# Patient Record
Sex: MALE | Race: WHITE | NOT HISPANIC OR LATINO | Employment: FULL TIME | URBAN - METROPOLITAN AREA
[De-identification: names, ages, dates, MRNs, and addresses within clinical notes are randomized per-mention and may not be internally consistent; named-entity substitution may affect disease eponyms.]

---

## 2017-06-26 ENCOUNTER — OFFICE VISIT (OUTPATIENT)
Dept: FAMILY MEDICINE | Facility: CLINIC | Age: 42
End: 2017-06-26
Payer: COMMERCIAL

## 2017-06-26 VITALS
WEIGHT: 181 LBS | TEMPERATURE: 97.9 F | DIASTOLIC BLOOD PRESSURE: 87 MMHG | OXYGEN SATURATION: 98 % | SYSTOLIC BLOOD PRESSURE: 139 MMHG | HEART RATE: 68 BPM | HEIGHT: 72 IN | BODY MASS INDEX: 24.52 KG/M2

## 2017-06-26 DIAGNOSIS — M54.41 LUMBAGO WITH SCIATICA, RIGHT SIDE: Primary | ICD-10-CM

## 2017-06-26 PROCEDURE — 99214 OFFICE O/P EST MOD 30 MIN: CPT | Performed by: PHYSICIAN ASSISTANT

## 2017-06-26 RX ORDER — TRAMADOL HYDROCHLORIDE 50 MG/1
TABLET ORAL
Qty: 30 TABLET | Refills: 0 | Status: SHIPPED | OUTPATIENT
Start: 2017-06-26 | End: 2017-07-03

## 2017-06-26 NOTE — PATIENT INSTRUCTIONS
Take 800 mg (4 tabs) ibuprofen with food every 8 hours for at least 3 days then as needed  I will follow up with MRI  Schedule with spine specialist  To emergency room with severe worsening symptoms    -Narcotic medications can be addicting and therefore are used for short term pain control only.  They are not intended for long-term use.    -Take them only for severe pain as needed.    -Never mix with alcohol.    -Do not drive after taking this medication.    -No refills without an office visit. No replacements will be given.    -Keep these medications kept in a safe location.  You are responsible for them.  -Do not give them to anyone else.  They are prescribed to you only.

## 2017-06-26 NOTE — MR AVS SNAPSHOT
After Visit Summary   6/26/2017    Gil Limon    MRN: [id number]           Patient Information     Date Of Birth          1975        Visit Information        Provider Department      6/26/2017 3:20 PM Kim Hearn PA-C [facility]        Today's Diagnoses     Lumbago with sciatica, right side    -  1      Care Instructions    Take 800 mg (4 tabs) ibuprofen with food every 8 hours for at least 3 days then as needed  I will follow up with MRI  Schedule with spine specialist  To emergency room with severe worsening symptoms    -Narcotic medications can be addicting and therefore are used for short term pain control only.  They are not intended for long-term use.    -Take them only for severe pain as needed.    -Never mix with alcohol.    -Do not drive after taking this medication.    -No refills without an office visit. No replacements will be given.    -Keep these medications kept in a safe location.  You are responsible for them.  -Do not give them to anyone else.  They are prescribed to you only.             Follow-ups after your visit        Future tests that were ordered for you today     Open Future Orders        Priority Expected Expires Ordered    MR Lumbar Spine w/o Contrast Routine  6/26/2018 6/26/2017            Who to contact     If you have questions or need follow up information about today's clinic visit or your schedule please contact [facility] directly at [phone].  Normal or non-critical lab and imaging results will be communicated to you by MyChart, letter or phone within 4 business days after the clinic has received the results. If you do not hear from us within 7 days, please contact the clinic through MyChart or phone. If you have a critical or abnormal lab result, we will notify you by phone as soon as possible.  Submit refill requests through [employer] or call your pharmacy and they will forward the refill request to us. Please  allow 3 business days for your refill to be completed.          Additional Information About Your Visit        Care EveryWhere ID     This is your Care EveryWhere ID. This could be used by other organizations to access your [place] medical records  [id number]        Your Vitals Were     Pulse Temperature Height Pulse Oximetry BMI (Body Mass Index)       68 97.9  F (36.6  C) (Oral) 6' (1.829 m) 98% 24.55 kg/m2        Blood Pressure from Last 3 Encounters:   06/26/17 139/87   03/27/15 139/75   03/14/14 121/66    Weight from Last 3 Encounters:   06/26/17 181 lb (82.1 kg)   03/27/15 185 lb (83.9 kg)   03/14/14 182 lb (82.6 kg)                 Today's Medication Changes          These changes are accurate as of: 6/26/17  3:58 PM.  If you have any questions, ask your nurse or doctor.               Start taking these medicines.        Dose/Directions    traMADol 50 MG tablet   Commonly known as:  ULTRAM   Used for:  Lumbago with sciatica, right side   Started by:  Kim Hearn PA-C        Take 1-2 tabs every 8 hours as needed for pain.   Quantity:  30 tablet   Refills:  0            Where to get your medicines      Some of these will need a paper prescription and others can be bought over the counter.  Ask your nurse if you have questions.     Bring a paper prescription for each of these medications     traMADol 50 MG tablet                Primary Care Provider Office Phone # Fax #    [place] [phone]       [address] [address]        Equal Access to Services     YUE GUARDADO AH: Hadii olivier potts Sojeannine, waaxda luqadaha, qaybta kaalmajess spears. So [facility] [phone].    ATENCIÓN: Si habla español, tiene a little disposición servicios gratuitos de asistencia lingüística. Llame al [phone].    We comply with applicable federal civil rights laws and [place] laws. We do not discriminate on  the basis of race, color, national origin, age, disability sex, sexual orientation or gender identity.            Thank you!     Thank you for choosing [facility]  [facility]. Our goal is always to provide you with excellent care. Hearing back from our patients is one way we can continue to improve our services. Please take a few minutes to complete the written survey that you may receive in the mail after your visit with us. Thank you!             Your Updated Medication List - Protect others around you: Learn how to safely use, store and throw away your medicines at www.disposemymeds.org.          This list is accurate as of: 6/26/17  3:58 PM.  Always use your most recent med list.                   Brand Name Dispense Instructions for use Diagnosis    NO ACTIVE MEDICATIONS      .        traMADol 50 MG tablet    ULTRAM    30 tablet    Take 1-2 tabs every 8 hours as needed for pain.    Lumbago with sciatica, right side

## 2017-06-26 NOTE — PROGRESS NOTES
SUBJECTIVE:                                                    Gil Limon is a 42 year old male who presents to clinic today for the following health issues:    Back Pain       Duration: x 11 days        Specific cause: possibly from playing volleyball, was more mild, then severe pain started more 3-4 days ago.  Needs help to get socks and shoes on.       Description:   Location of pain: low back right but also entire low back. Into buttocks mostly on right side, not past that.  Not into leg or foot.  No leg weakness.     Character of pain: sharp, dull ache, stabbing, gnawing, burning and cramping  Pain radiation:none  New numbness or weakness in legs, not attributed to pain:  no     Intensity: moderate    History:   Pain interferes with job: YES  History of back problems: recurrent self limited episodes of low back pain in the past  Any previous MRI or X-rays: Yes- at Chiropractor.  Date 1 year ago  Sees a specialist for back pain:  No  Therapies tried without relief: chiropractor    Alleviating factors:   Improved by: chiropractor      Precipitating factors:  Worsened by: Lifting, Bending, Standing, Sitting, Lying Flat and Walking. Flexion worse than extension it seems.           Accompanying Signs & Symptoms:  Risk of Fracture:  None  Risk of Cauda Equina:  None  Risk of Infection:  None  Risk of Cancer:  None  Risk of Ankylosing Spondylitis:  Onset at age <35, male, AND morning back stiffness. no     Patient is here with acute symptoms but has h/o back pain also that flares up on and off.   Last visit was to chiropractor 3 days ago. Did not change his pain.   Has had back pain off and on but nothing like this before this is much worse.   Had to take off work from the pain.   Works for the [employer] in the [employer]. Needs note for yesterday and today.   No red flags.     Tried muscle relaxant (flexeril) 10 mg tablet, did not help.   Tried ibuprofen which did not help. Tried ice.  Helps only temporarily.      Has had x rays through chiropractor, mostly normal.  Never had MRI.     vicodin and percocet make him nauseous. Would like something different for pain.     [place].     No history of seizures.           Problem list and histories reviewed & adjusted, as indicated.  Additional history: as documented    Patient Active Problem List   Diagnosis     CARDIOVASCULAR SCREENING; LDL GOAL LESS THAN 160     Past Surgical History:   Procedure Laterality Date     LAP VENTRAL HERNIA REPAIR      2003        Social History   Substance Use Topics     Smoking status: Never Smoker     Smokeless tobacco: Never Used     Alcohol use Yes      Comment: SOC     Family History   Problem Relation Age of Onset     OSTEOPOROSIS Mother      OSTEOPOROSIS Maternal Grandmother      CEREBROVASCULAR DISEASE Maternal Grandfather      Lipids Maternal Grandfather      CANCER Father      skin, melanoma         Current Outpatient Prescriptions   Medication Sig Dispense Refill     traMADol (ULTRAM) 50 MG tablet Take 1-2 tabs every 8 hours as needed for pain. 30 tablet 0     NO ACTIVE MEDICATIONS .       Allergies   Allergen Reactions     Nkda [No Known Drug Allergies]        ROS:  Constitutional, HEENT, cardiovascular, pulmonary, GI, , musculoskeletal, neuro, skin, endocrine and psych systems are negative, except as otherwise noted.    OBJECTIVE:     /87  Pulse 68  Temp 97.9  F (36.6  C) (Oral)  Ht 6' (1.829 m)  Wt 181 lb (82.1 kg)  SpO2 98%  BMI 24.55 kg/m2  Body mass index is 24.55 kg/(m^2).  GENERAL:  No acute distress.  Interacts and answers questions appropriately.  Alert and oriented.  HEENT:   Oral mucosa moist.  Posterior pharynx non-erythematous.  NECK:  Soft and supple.  without tenderness.    Normal range of motion.    CARDIAC:   Regular rate and rhythm.  No murmurs, rubs, or gallops.   PULMONARY: Clear to auscultation bilaterally.  No  wheezes, crackles, or rhonchi.  Normal air exchange/breath sounds.    MUSCULOSKELETAL:  Low  over -no location.  Not tender over lumbar vertebrae.    No erythema, ecchymosis, edema, or warmth.  Range of motion normal and strength not assessed well due to discomfort. He is able to toe walk and walks normally.   Distal sensation and pulses intact.   Straight leg raise neg.  NEUROLOGICAL:  Alert and oriented.  Gait normal.  Cranial nerves II-XII grossly intact.  Patellar reflexes 2+ and equal bilaterally.  PSYCH: Normal affect.  SKIN: No rashes.          ASSESSMENT/PLAN:     ASSESSMENT / PLAN:  (M54.41) Lumbago with sciatica, right side  (primary encounter diagnosis)  Comment: acute on chronic back pain from his history, patient would like to schedule MRI asap due to significant pain. See patient instructions below for plan.    Plan: MR Lumbar Spine w/o Contrast, traMADol (ULTRAM)        50 MG tablet               Has MRI schedule for  (in 2 days). Will refer to spine specialist or surgeon depending on results.       Patient Instructions   Take 800 mg (4 tabs) ibuprofen with food every 8 hours for at least 3 days then as needed  I will follow up with MRI  Schedule with spine specialist  To emergency room with severe worsening symptoms    -Narcotic medications can be addicting and therefore are used for short term pain control only.  They are not intended for long-term use.    -Take them only for severe pain as needed.    -Never mix with alcohol.    -Do not drive after taking this medication.    -No refills without an office visit. No replacements will be given.    -Keep these medications kept in a safe location.  You are responsible for them.  -Do not give them to anyone else.  They are prescribed to you only.           Billin min spent face-to-face with patient. 15 min on history, 5 on exam, 5 on discussing diagnosis and treatment plan.       Kim Hearn PA-C  [facility]

## 2017-06-26 NOTE — NURSING NOTE
Chief Complaint   Patient presents with     Back Pain     back pain per pt x 11 days now possible injury from playing volley ball        Initial /87  Pulse 68  Temp 97.9  F (36.6  C) (Oral)  Ht 6' (1.829 m)  Wt 181 lb (82.1 kg)  SpO2 98%  BMI 24.55 kg/m2 Estimated body mass index is 24.55 kg/(m^2) as calculated from the following:    Height as of this encounter: 6' (1.829 m).    Weight as of this encounter: 181 lb (82.1 kg).  Medication Reconciliation: complete      Janes Marrero MA

## 2017-06-26 NOTE — LETTER
[facility]  [address] [facility] [address]  Phone: [phone]    June 26, 2017        Gil Limon  [address] [facility] [address]          To whom it may concern:    RE: Gil Limon  Patient was seen and treated today at our clinic and missed work for acute illness.  They may be excused 6-25-17 through  6-28-17 and return when symptoms improve.       Please contact me for questions or concerns.      Sincerely,        Kim Hearn PA-C

## 2017-06-28 ENCOUNTER — RADIANT APPOINTMENT (OUTPATIENT)
Dept: MRI IMAGING | Facility: CLINIC | Age: 42
End: 2017-06-28
Attending: PHYSICIAN ASSISTANT
Payer: COMMERCIAL

## 2017-06-28 DIAGNOSIS — M54.41 LUMBAGO WITH SCIATICA, RIGHT SIDE: ICD-10-CM

## 2017-06-28 PROCEDURE — 72148 MRI LUMBAR SPINE W/O DYE: CPT | Mod: TC

## 2017-06-28 NOTE — LETTER
[facility]  [address] [place] [address] [phone]        June 30, 2017    Gil Limon  [address] [facility] [address]            Dear Gil,    It was a pleasure to see you at your recent office visit.  Your test results are listed below.  MRI shows nothing concerning, you have early arthritis in your spine (degenerative disc they call it) but not impingement on nerves or bulging discs or narrowing of your spine canal.  This is good news.    If you have ongoing symptoms, please schedule with ortho-spine non-surgical specialists. Referral has been placed. Please give patient number.         If you have any questions or concerns, please call the clinic at [phone].    Sincerely,  Kim Hearn PA-C    Results for orders placed or performed in visit on 06/28/17   MR Lumbar Spine w/o Contrast    Narrative    MR LUMBAR SPINE WITHOUT CONTRAST   6/28/2017 7:19 PM    HISTORY: Chronic bilateral back pain, increased in the last six days.  History of injury.    TECHNIQUE: Sagittal T1 and T2 and STIR, axial proton density and T2  images.    COMPARISON: None.    FINDINGS: Five functional lumbar vertebral segments are assumed. The  caudal thecal sac contents appear intrinsically normal. Alignment in  the sagittal plane is normal. Vertebral bone marrow signal is  unremarkable.    T12-L1: Normal.    L1-L2: Normal.    L2-L3: Normal    L3-L4: Early signal loss and minimal disc space narrowing without disc  bulge/protrusion or central or foraminal stenosis. Posterior facets  are normal.    L4-L5: Signal loss, minimal disc space narrowing and posterior disc  bulging or very small broad-based central disc protrusion slightly  indenting the anterior thecal sac. No central or foraminal stenosis  and the posterior facets are normal.    L5-S1: Signal loss, mild disc space narrowing and no disc  bulge/protrusion or central or foraminal stenosis. Posterior facets  are  unremarkable.      Impression    IMPRESSION: Early degenerative disc disease at multiple levels in the  lower lumbar spine with no direct impingement on neural structures.    RADHA DUNN MD

## 2017-06-29 ENCOUNTER — TELEPHONE (OUTPATIENT)
Dept: FAMILY MEDICINE | Facility: CLINIC | Age: 42
End: 2017-06-29

## 2017-06-29 NOTE — TELEPHONE ENCOUNTER
"Spouse states that patient is in [facility] right now and they want the results of his MRI from yesterday.  Please \"push\" results to [facility] as they are waiting to start treatment based on the results.  Also fax is [phone].    Thank you.  "

## 2017-06-29 NOTE — TELEPHONE ENCOUNTER
"Called X-ray department and they \"pushed\" images to [facility]. Faxed report to to [facility] to the fax number in the message. Called and informed wife that this was done.Ella Gilbert MA/LOLLY    "

## 2017-06-30 NOTE — PROGRESS NOTES
pLEASE CALL PATIENT: Dear Gil,      It was a pleasure to see you at your recent office visit.  Your test results are listed below.  MRI shows nothing concerning, you have early arthritis in your spine (degenerative disc they call it) but not impingement on nerves or bulging discs or narrowing of your spine canal.  This is good news.    If you have ongoing symptoms, please schedule with ortho-spine non-surgical specialists. Referral has been placed. Please give patient number.         If you have any questions or concerns, please call the clinic at [phone].    Sincerely,  Kim Hearn PA-C

## 2017-07-03 ENCOUNTER — OFFICE VISIT (OUTPATIENT)
Dept: ORTHOPEDICS | Facility: CLINIC | Age: 42
End: 2017-07-03
Payer: COMMERCIAL

## 2017-07-03 VITALS
BODY MASS INDEX: 24.52 KG/M2 | WEIGHT: 181 LBS | DIASTOLIC BLOOD PRESSURE: 86 MMHG | HEIGHT: 72 IN | HEART RATE: 80 BPM | SYSTOLIC BLOOD PRESSURE: 143 MMHG

## 2017-07-03 DIAGNOSIS — M51.26 LUMBAR DISC HERNIATION: Primary | ICD-10-CM

## 2017-07-03 PROCEDURE — 99204 OFFICE O/P NEW MOD 45 MIN: CPT | Performed by: PEDIATRICS

## 2017-07-03 RX ORDER — PREDNISONE 10 MG/1
TABLET ORAL
COMMUNITY
Start: 2017-07-03 | End: 2018-10-16

## 2017-07-03 NOTE — PATIENT INSTRUCTIONS
Plan:  - Today's Plan of Care:  Referral to a Spine Surgeon  Rehab: Physical Therapy: [facility] - [phone]  Follow up with PCP    Follow Up: As needed. Call with any questions or concerns.

## 2017-07-03 NOTE — MR AVS SNAPSHOT
After Visit Summary   7/3/2017    Gil Limon    MRN: [id number]           Patient Information     Date Of Birth          1975        Visit Information        Provider Department      7/3/2017 1:00 PM Humera Grubbs MD [facility] Rahul        Today's Diagnoses     Lumbar disc herniation    -  1      Care Instructions      Plan:  - Today's Plan of Care:  Referral to a Spine Surgeon  Rehab: Physical Therapy: [facility] - [phone]  Follow up with PCP    Follow Up: As needed. Call with any questions or concerns.                 Follow-ups after your visit        Additional Services     [facility] PT, HAND, AND CHIROPRACTIC REFERRAL       **This order will print in the [facility] Scheduling Office**    Physical Therapy, Hand Therapy and Chiropractic Care are available through:    *[facility]  *[facility]  *[facility]    Call one number to schedule at any of the above locations: [phone].    Your provider has referred you to: Physical Therapy at [facility] or [facility]    Indication/Reason for Referral: Low Back Pain  Onset of Illness: 2 weeks  Therapy Orders: Evaluate and Treat  Special Programs: None  Special Request: None    Loni Ivey      Additional Comments for the Therapist or Chiropractor: none    Please be aware that coverage of these services is subject to the terms and limitations of your health insurance plan.  Call member services at your health plan with any benefit or coverage questions.      Please bring the following to your appointment:    *Your personal calendar for scheduling future appointments  *Comfortable clothing            ORTHO  REFERRAL       [facility] is referring you to the Orthopedic  Services at [facility].       The  Representative will assist you in the coordination of your Orthopedic and Musculoskeletal  Care as prescribed by your physician.    The  Representative will call you within 24 hours to help schedule your appointment, or you may contact the  Representative at:    [facility] ~ [phone]  [facility] ~ [phone]  [facility] ~ [phone]    Type of Referral : Spine: Lumbar  **Choose Medical Spine Specialist (unless patient was seen by a Medical Spine Specialist within the past 6 months).**  Surgical Evaluation is advised if the patient presents with one or more of the following red flags: Evidence of Spinal Tumor, Infection or Fracture, Cauda Equina Syndrome, Sudden or Progressive Weakness, Loss of Bowel or Bladder Control, or any other documented emergent neurological condition resulting from a Lumbar Spinal Condition. Spine Surgeon        Timeframe requested: 3 - 5 days     Coverage of these services is subject to the terms and limitations of your health insurance plan.  Please call member services at your health plan with any benefit or coverage questions.      If X-rays, CT or MRI's have been performed, please contact the facility where they were done to arrange for , prior to your scheduled appointment.  Please bring this referral request to your appointment and present it to your specialist.                  Who to contact     If you have questions or need follow up information about today's clinic visit or your schedule please contact [facility] directly at [phone].  Normal or non-critical lab and imaging results will be communicated to you by MyChart, letter or phone within 4 business days after the clinic has received the results. If you do not hear from us within 7 days, please contact the clinic through MyChart or phone. If you have a critical or abnormal lab result, we will notify you by phone as soon as possible.  Submit refill requests through [employer] or call your pharmacy and they  will forward the refill request to us. Please allow 3 business days for your refill to be completed.          Additional Information About Your Visit        Care EveryWhere ID     This is your Care EveryWhere ID. This could be used by other organizations to access your [place] medical records  [id number]        Your Vitals Were     Pulse Height BMI (Body Mass Index)             80 6' (1.829 m) 24.55 kg/m2          Blood Pressure from Last 3 Encounters:   07/03/17 143/86   06/26/17 139/87   03/27/15 139/75    Weight from Last 3 Encounters:   07/03/17 181 lb (82.1 kg)   06/26/17 181 lb (82.1 kg)   03/27/15 185 lb (83.9 kg)              We Performed the Following     NIK PT, HAND, AND CHIROPRACTIC REFERRAL     ORTHO  REFERRAL        Primary Care Provider Office Phone # Fax #    [place] [place] [phone]       [facility] [id number] [place] [address]        Equal Access to Services     DIOGENES GUARDADO AH: Hadii aad ku hadasho Soomaali, waaxda luqadaha, qaybta kaalmada adeegyada, waxay idiin hayaan candi kharash larafi . So [facility] [phone].    ATENCIÓN: Si habla español, tiene a little disposición servicios gratuitos de asistencia lingüística. [facility] [phone].    We comply with applicable federal civil rights laws and [place] laws. We do not discriminate on the basis of race, color, national origin, age, disability sex, sexual orientation or gender identity.            Thank you!     Thank you for choosing [facility] [place]  for your care. Our goal is always to provide you with excellent care. Hearing back from our patients is one way we can continue to improve our services. Please take a few minutes to complete the written survey that you may receive in the mail after your visit with us. Thank you!             Your Updated Medication List - Protect others around you: Learn how to safely use, store and throw away your medicines at  www.disposemymeds.org.          This list is accurate as of: 7/3/17  2:28 PM.  Always use your most recent med list.                   Brand Name Dispense Instructions for use Diagnosis    DILAUDID PO      Take 2 mg by mouth every 4 hours as needed for moderate to severe pain        NO ACTIVE MEDICATIONS      .        predniSONE 10 MG tablet    DELTASONE

## 2017-07-03 NOTE — PROGRESS NOTES
Sports Medicine Clinic Visit    PCP: Scar Cuba    Gil Limon is a 42 year old male who is seen  in consultation at the request of Kim Hearn presenting with low back pain.    Injury: Patient denies any injury, reports pain started 2 weeks ago after reaching out of bed at a 90 degree angle.  PCP ordered MRI.  Late last week had severe pain and couldn't get out of bed, called 911 and has been in the hospital for 3 days for IV pain medication.  Was discharged this morning.  Currently on prednisone, dilaudid and flexeril.  Pain is in low back and tailbone.  Nothing down legs.  Does have buttock pain with walking.  No bowel or bladder problems.  - Per Care Everywhere, there was some concern for possible infection given elevated inflammatory markers initially (CRP 4.84 7/1 and 3.05 7/2 and ESR 30 7/1 and 39 7/2).  Spine was consulted while in hospital.    Gil was asked to complete the Oswestry Low Back Disability Index and Loni Start Back screening tool.  today in the office.  Disability score: 52%.     Location of Pain: lower back, sacrum  Duration of Pain: 2 week(s)  Rating of Pain at worst: 10/10  Rating of Pain Currently: 5/10  Symptoms are better with: IV pain medication, prednisone  Symptoms are worse with: extension, flexion and any position for prolonged periods of time  Additional Features:   Positive: pain   Negative: swelling, bruising, popping, grinding, catching, locking, instability, paresthesias, numbness, weakness, pain in other joints and systemic symptoms  Other evaluation and/or treatments so far consists of: hospital admission  Prior History of related problems: mild episodes needing 1 adjustment at chiropractor    Social History: [facility] office    Review of Systems  Skin: no bruising, no swelling  Musculoskeletal: as above  Neurologic: no numbness, paresthesias  Remainder of review of systems is negative including constitutional, CV, pulmonary, GI, except as noted  in HPI or medical history.    Patient's current problem list, past medical and surgical history, and family history were reviewed.    Patient Active Problem List   Diagnosis     CARDIOVASCULAR SCREENING; LDL GOAL LESS THAN 160     Past Medical History:   Diagnosis Date     NO ACTIVE PROBLEMS      Past Surgical History:   Procedure Laterality Date     LAP VENTRAL HERNIA REPAIR      2003      Family History   Problem Relation Age of Onset     OSTEOPOROSIS Mother      OSTEOPOROSIS Maternal Grandmother      CEREBROVASCULAR DISEASE Maternal Grandfather      Lipids Maternal Grandfather      CANCER Father      skin, melanoma       Objective  /86  Pulse 80  Ht 6' (1.829 m)  Wt 181 lb (82.1 kg)  BMI 24.55 kg/m2    GENERAL APPEARANCE: healthy, alert and no distress   GAIT: stiff  SKIN: no suspicious lesions or rashes  HEENT: Sclera clear, anicteric  CV: good peripheral pulses  RESP: Breathing not labored  NEURO: Normal strength and tone, mentation intact and speech normal  PSYCH:  mentation appears normal and affect normal/bright    Low back exam:    Inspection:     no visible deformity in the low back       normal skin       normal vascular       normal lymphatic    Posture:      normal    Tender:     midline       paraspinal muscles       Throughout low back    Non Tender:     remainder of lumbar spine    ROM:      limited flexion due to pain       limited extension due to pain    Strength:     hip flexion 5/5 bilateral       knee extension 4/5 bilateral       ankle dorsiflexion 5/5 bilateral       ankle plantarflexion 5/5 bilateral       dorsiflexion of the great toe 5/5 bilateral       able to heel and toe walk    Reflexes:     patellar (L3, L4) symmetric absent       achilles tendons (S1) symmetric absent    Sensation:    grossly intact throughout lower extremities    Special tests:      straight leg raise left positive        straight leg raise right positive      Radiology  I visualized and reviewed these  images with the patient  MR LUMBAR SPINE WITHOUT CONTRAST   6/28/2017 7:19 PM     HISTORY: Chronic bilateral back pain, increased in the last six days.  History of injury.     TECHNIQUE: Sagittal T1 and T2 and STIR, axial proton density and T2  images.     COMPARISON: None.     FINDINGS: Five functional lumbar vertebral segments are assumed. The  caudal thecal sac contents appear intrinsically normal. Alignment in  the sagittal plane is normal. Vertebral bone marrow signal is  unremarkable.     T12-L1: Normal.     L1-L2: Normal.     L2-L3: Normal     L3-L4: Early signal loss and minimal disc space narrowing without disc  bulge/protrusion or central or foraminal stenosis. Posterior facets  are normal.     L4-L5: Signal loss, minimal disc space narrowing and posterior disc  bulging or very small broad-based central disc protrusion slightly  indenting the anterior thecal sac. No central or foraminal stenosis  and the posterior facets are normal.     L5-S1: Signal loss, mild disc space narrowing and no disc  bulge/protrusion or central or foraminal stenosis. Posterior facets  are unremarkable.         IMPRESSION: Early degenerative disc disease at multiple levels in the  lower lumbar spine with no direct impingement on neural structures.    I reviewed these imaging reports with the patient    MR Spine Lumbar w/wo Contrast7/2/2017  [facility]  Result Narrative   Clinical History: Back Pain    Technique: Multiplanar multisequence lumbar spine MRI without and with intravenous gadolinium.     Comparison:  CT 7/1/2017    Findings: Normal alignment lumbar vertebral bodies. No marrow edema involving bone. Conus medullaris is normal. Small cortical cyst anterior cortex right kidney.    L1-2: Normal disc, central canal and neural foramina.    L2-3: Normal disc, central canal and neural foramina.    L3-4: Normal disc, central canal and neural foramina.    L4-5: Moderate left paracentral disc  protrusion indenting the left ventral thecal sac. Central canal appears normal. Neural foramina are adequate in size.    L5-S1: Normal disc, central canal and neural foramina.    Impression: Moderate size left paracentral disc protrusion at the L4-5 level indenting the left ventral thecal sac. MRI scan of the lumbar spine is otherwise negative.     MR PELVIS MUSCULOSKELETAL WWO7/2/2017  [facility]  Result Narrative   Clinical History: Acute low back pain. Elevated CRP and ESR.    Technique: Multiplanar T1 and T2 weighted sequences of the pelvis without and with intravenous gadolinium.    Comparison: None.    Findings:     Bones and joints: The SI joints appear normal and symmetric. No evidence of degenerative, inflammatory or septic arthropathy. The hips also appear normal and symmetric with no evidence of effusion, intra-articular body, degenerative or inflammatory arthropathy or synovitis. The pubic symphysis appears normal. No fracture or osteonecrosis.    Muscles and tendons: The hamstring tendons appear intact. The abductor tendons appear intact. The muscles appear normal and symmetric. No edema or atrophy. No evidence of myositis.    Musculoskeletal soft tissues: No hematoma, cyst or mass. No significant fluid in the trochanteric bursae. No abscess. No evidence of inflammatory changes.    Intrapelvic soft tissues: Small amount of free fluid in the pelvis. No evidence of lymphadenopathy or abscess.    Impression: Small amount of free fluid in the pelvis. Otherwise negative musculoskeletal pelvis MRI without and with contrast. Please see today's lumbar spine MRI report for details regarding the lumbar spine.  [facility]     CT SPINE LUMBAR WO7/1/2017  [facility]  Result Narrative   Clinical History: Pain.    Technique: Lumbar spine CT without contrast.  Helical acquisition with multiplanar reconstructions. This CT Scan  used dose modulation, iterative reconstruction, and/or weight based dosing when appropriate to reduce radiation dose to as low as reasonably achievable.    Comparison: None.    Findings: Normal alignment of the lumbar vertebral bodies. No compression fracture, spondylolysis or spondylolisthesis.    L1-2: Normal disc, central canal and neural foramina.    L2-3: Normal disc, central canal and neural foramina.    L3-4: Normal disc, central canal and neural foramina.    L4-5: Shallow left paracentral bulging disc has a minor compression left ventral thecal sac. Central canal and foramina appear normal.    L5-S1: Broad-based central bulging disc. Central canal and foramina appear normal.    Impression:   1. Broad-based left paracentral bulging disc at the L4-5 level causing mild compression along the left ventral thecal sac.  2. Shallow broad-based central bulging disc at L5-S1 without focal herniation.  3. No significant central or foraminal narrowing.  [facility]       Assessment:  1. Lumbar disc herniation      Left sided lumbar disc herniation causing severe pain.  We discussed options for further treatment and patient could benefit from physical therapy. I also recommend follow-up with spine surgery given severe episode requiring hospitalization along with large left-sided disc herniation. Follow-up with primary care as well given hospitalization.    Plan:  - Today's Plan of Care:  Referral to a Spine Surgeon  Rehab: Physical Therapy: [facility] - [phone]  Follow up with PCP    Follow Up: As needed. Call with any questions or concerns.     Concerning signs and symptoms were reviewed.  The patient expressed understanding of this management plan and all questions were answered at this time.    Thanks for the opportunity to participate in the care of this patient, I will keep you updated on their progress.    CC: Kim Grubbs MD CAQ  Primary Care  Sports Medicine  [facility]

## 2017-07-12 ENCOUNTER — TELEPHONE (OUTPATIENT)
Dept: FAMILY MEDICINE | Facility: OTHER | Age: 42
End: 2017-07-12

## 2017-07-12 NOTE — TELEPHONE ENCOUNTER
Per Olga Dumont PA-C, okay for patient to cancel appt for tomorrow if it's his preference. He may proceed with PT and schedule with us later if he wishes. LVM with patient.

## 2017-07-12 NOTE — TELEPHONE ENCOUNTER
Pt calling inquiring about upcoming appointment with Tim, he is unsure why this appointment is scheduled and would like a return call with detailed information.  He feels at this time the only form of appointment he should have is for physical therapy on his back.

## 2018-10-16 ENCOUNTER — OFFICE VISIT (OUTPATIENT)
Dept: FAMILY MEDICINE | Facility: CLINIC | Age: 43
End: 2018-10-16
Payer: COMMERCIAL

## 2018-10-16 VITALS
OXYGEN SATURATION: 98 % | BODY MASS INDEX: 25.77 KG/M2 | HEART RATE: 69 BPM | DIASTOLIC BLOOD PRESSURE: 84 MMHG | TEMPERATURE: 97 F | RESPIRATION RATE: 16 BRPM | SYSTOLIC BLOOD PRESSURE: 133 MMHG | WEIGHT: 190 LBS

## 2018-10-16 DIAGNOSIS — J01.90 ACUTE SINUSITIS WITH SYMPTOMS > 10 DAYS: Primary | ICD-10-CM

## 2018-10-16 PROCEDURE — 99213 OFFICE O/P EST LOW 20 MIN: CPT | Performed by: PHYSICIAN ASSISTANT

## 2018-10-16 RX ORDER — OXYMETAZOLINE HYDROCHLORIDE 0.05 G/100ML
2-3 SPRAY NASAL 2 TIMES DAILY PRN
Qty: 1 BOTTLE | Refills: 0 | Status: SHIPPED | OUTPATIENT
Start: 2018-10-16 | End: 2018-10-24

## 2018-10-16 RX ORDER — AZITHROMYCIN 500 MG/1
500 TABLET, FILM COATED ORAL DAILY
Qty: 3 TABLET | Refills: 0 | Status: SHIPPED | OUTPATIENT
Start: 2018-10-16 | End: 2018-10-24

## 2018-10-16 NOTE — MR AVS SNAPSHOT
After Visit Summary   10/16/2018    Gil Limon    MRN: [id number]           Patient Information     Date Of Birth          1975        Visit Information        Provider Department      10/16/2018 1:40 PM Enio Easton PA-C [facility]        Today's Diagnoses     Acute sinusitis with symptoms > 10 days    -  1       Follow-ups after your visit        Who to contact     If you have questions or need follow up information about today's clinic visit or your schedule please contact [facility] directly at [phone].  Normal or non-critical lab and imaging results will be communicated to you by MyChart, letter or phone within 4 business days after the clinic has received the results. If you do not hear from us within 7 days, please contact the clinic through MyChart or phone. If you have a critical or abnormal lab result, we will notify you by phone as soon as possible.  Submit refill requests through [employer] or call your pharmacy and they will forward the refill request to us. Please allow 3 business days for your refill to be completed.          Additional Information About Your Visit        Care EveryWhere ID     This is your Care EveryWhere ID. This could be used by other organizations to access your [facility] medical records  [id number]        Your Vitals Were     Pulse Temperature Respirations Pulse Oximetry BMI (Body Mass Index)       69 97  F (36.1  C) (Oral) 16 98% 25.77 kg/m2        Blood Pressure from Last 3 Encounters:   10/16/18 133/84   07/03/17 143/86   06/26/17 139/87    Weight from Last 3 Encounters:   10/16/18 190 lb (86.2 kg)   07/03/17 181 lb (82.1 kg)   06/26/17 181 lb (82.1 kg)              Today, you had the following     No orders found for display         Today's Medication Changes          These changes are accurate as of 10/16/18  2:06 PM.  If you have any questions, ask your nurse or doctor.               Start taking these  medicines.        Dose/Directions    azithromycin 500 MG tablet   Commonly known as:  ZITHROMAX   Used for:  Acute sinusitis with symptoms > 10 days   Started by:  Enio Easton PA-C        Dose:  500 mg   Take 1 tablet (500 mg) by mouth daily   Quantity:  3 tablet   Refills:  0       oxymetazoline 0.05 % spray   Commonly known as:  AFRIN NASAL SPRAY   Used for:  Acute sinusitis with symptoms > 10 days   Started by:  Enio Easton PA-C        Dose:  2-3 spray   Spray 2-3 sprays into both nostrils 2 times daily as needed for congestion   Quantity:  1 Bottle   Refills:  0            Where to get your medicines      These medications were sent to [facility] [place] [address] [address], [facility] [address]  [id number] [place], [place] [address] [address], [facility] [address]     Phone:  [phone]     azithromycin 500 MG tablet    oxymetazoline 0.05 % spray                Primary Care Provider Office Phone # Fax #    Scar Cuba [phone] [address] [facility] [address]        Equal Access to Services     [facility]: Hadii aad ku hadasho Soomaali, waaxda luqadaha, qaybta kaalmada adeegyada, jess flores . So [facility] [phone].    ATENCIÓN: Si habla español, tiene a little disposición servicios gratuitos de asistencia lingüística. [facility] [phone].    We comply with applicable federal civil rights laws and [place] laws. We do not discriminate on the basis of race, color, national origin, age, disability, sex, sexual orientation, or gender identity.            Thank you!     Thank you for choosing [facility]  for your care. Our goal is always to provide you with excellent care. Hearing back from our patients is one way we can continue to improve our services. Please take a few minutes to complete the written survey that you may receive in the mail after your visit with us. Thank you!             Your Updated Medication List -  Protect others around you: Learn how to safely use, store and throw away your medicines at www.disposemymeds.org.          This list is accurate as of 10/16/18  2:06 PM.  Always use your most recent med list.                   Brand Name Dispense Instructions for use Diagnosis    azithromycin 500 MG tablet    ZITHROMAX    3 tablet    Take 1 tablet (500 mg) by mouth daily    Acute sinusitis with symptoms > 10 days       oxymetazoline 0.05 % spray    AFRIN NASAL SPRAY    1 Bottle    Spray 2-3 sprays into both nostrils 2 times daily as needed for congestion    Acute sinusitis with symptoms > 10 days

## 2018-10-16 NOTE — PROGRESS NOTES
SUBJECTIVE:   Gil Limon is a 43 year old male who presents to clinic today for the following health issues:      ENT Symptoms             Symptoms: cc Present Absent Comment   Fever/Chills   x    Fatigue  x     Muscle Aches   x    Eye Irritation   x    Sneezing  x     Nasal Timmy/Drg  x     Sinus Pressure/Pain  x     Loss of smell  x     Dental pain   x    Sore Throat  x     Swollen Glands   x    Ear Pain/Fullness   x    Cough  x     Wheeze   x    Chest Pain   x    Shortness of breath   x    Rash   x    Other         Symptom duration:  2 weeks   Symptom severity:  moderate   Treatments tried:  rest and fluids, ibuprofen    Contacts:  unknown         Problem list and histories reviewed & adjusted, as indicated.  Additional history: as documented      Reviewed and updated as needed this visit by clinical staff  Tobacco  Allergies  Meds       ROS:  Constitutional, HEENT, cardiovascular, pulmonary, gi and gu systems are negative, except as otherwise noted.    OBJECTIVE:     /84  Pulse 69  Temp 97  F (36.1  C) (Oral)  Resp 16  Wt 190 lb (86.2 kg)  SpO2 98%  BMI 25.77 kg/m2  Body mass index is 25.77 kg/(m^2).  GEN: Well developed, well nourished in NAD.  HEENT: Normocephalic. Eyes: + conjunctival flushing noted. EARS: TMs WNL, Canals clear.  Nose: Edematous mucosa without lesion. No rhinorrhea. Mouth/Pharynx: + cobblestoning and telangiectasia. + Percussed  maxillary Sinus tenderness.  NECK: Supple with No anterior cervical lymphadenopathy.  No Thyromegaly  RESP: CTA with good air entry all fields.  SKIN: No Exanthem noted.      Diagnostic Test Results:  none     ASSESSMENT/PLAN:   1. Acute sinusitis with symptoms > 10 days  - oxymetazoline (AFRIN NASAL SPRAY) 0.05 % spray; Spray 2-3 sprays into both nostrils 2 times daily as needed for congestion  Dispense: 1 Bottle; Refill: 0  - azithromycin (ZITHROMAX) 500 MG tablet; Take 1 tablet (500 mg) by mouth daily  Dispense: 3 tablet; Refill: 0    Use  medication as directed.  Follow up if symptoms should persist, change or worsen.  Patient amenable to this follow up plan.     Enio Easton PA-C  [facility]

## 2018-10-24 ENCOUNTER — OFFICE VISIT (OUTPATIENT)
Dept: FAMILY MEDICINE | Facility: CLINIC | Age: 43
End: 2018-10-24
Payer: COMMERCIAL

## 2018-10-24 VITALS
DIASTOLIC BLOOD PRESSURE: 81 MMHG | HEIGHT: 72 IN | BODY MASS INDEX: 25.73 KG/M2 | SYSTOLIC BLOOD PRESSURE: 127 MMHG | WEIGHT: 190 LBS | HEART RATE: 67 BPM | OXYGEN SATURATION: 97 %

## 2018-10-24 DIAGNOSIS — J02.9 SORE THROAT: Primary | ICD-10-CM

## 2018-10-24 LAB
DEPRECATED S PYO AG THROAT QL EIA: NORMAL
HETEROPH AB SER QL: NEGATIVE
SPECIMEN SOURCE: NORMAL

## 2018-10-24 PROCEDURE — 87880 STREP A ASSAY W/OPTIC: CPT | Performed by: INTERNAL MEDICINE

## 2018-10-24 PROCEDURE — 99213 OFFICE O/P EST LOW 20 MIN: CPT | Performed by: INTERNAL MEDICINE

## 2018-10-24 PROCEDURE — 36415 COLL VENOUS BLD VENIPUNCTURE: CPT | Performed by: INTERNAL MEDICINE

## 2018-10-24 PROCEDURE — 86308 HETEROPHILE ANTIBODY SCREEN: CPT | Performed by: INTERNAL MEDICINE

## 2018-10-24 PROCEDURE — 87081 CULTURE SCREEN ONLY: CPT | Performed by: INTERNAL MEDICINE

## 2018-10-24 RX ORDER — FLUTICASONE PROPIONATE 50 MCG
2 SPRAY, SUSPENSION (ML) NASAL DAILY
Qty: 1 BOTTLE | Refills: 0 | Status: SHIPPED | OUTPATIENT
Start: 2018-10-24

## 2018-10-24 NOTE — PROGRESS NOTES
SUBJECTIVE:  Gil Limon is an 43 year old male who presents for not feeling well.  Has had sore throat for a couple weeks.  Has had sore throat over the past month most of the time, but got better for a few days a couple weeks ago.  Was treated with zmax for sinuses recently which helped sinuses. Also was told to use afrin nasal spray which he used for 3 days.  Has had some nasal congestion and drainage, which has improved some but throat is worse.  Minimal cough.  No skin rashes.  Some ear discomfort.  No n/v/d.  No fevers, chills, sweats.  No recent travel.  No known exposures, but is around people at work. Over past couple robles has had more colds than he used to.  Take ibuprofen occasionally but none taken today.  Has felt tired some.    PMH:   has a past medical history of NO ACTIVE PROBLEMS.  Patient Active Problem List   Diagnosis     CARDIOVASCULAR SCREENING; LDL GOAL LESS THAN 160     Social History     Social History     Marital status:      Spouse name: N/A     Number of children: N/A     Years of education: N/A     Social History Main Topics     Smoking status: Never Smoker     Smokeless tobacco: Never Used     Alcohol use Yes      Comment: SOC     Drug use: No     Sexual activity: Yes     Partners: Female     Other Topics Concern     None     Social History Narrative     Family History   Problem Relation Age of Onset     Osteoporosis Mother      Osteoporosis Maternal Grandmother      Cerebrovascular Disease Maternal Grandfather      Lipids Maternal Grandfather      Cancer Father      skin, melanoma       ALLERGIES:  Nkda [no known drug allergies]    Current Outpatient Prescriptions   Medication         No current facility-administered medications for this visit.          ROS:  ROS is done and is negative for general/constitutional, eye, ENT, Respiratory, cardiovascular, GI, , Skin, musculoskeletal except as noted elsewhere.  All other review of systems negative except as noted  elsewhere.      OBJECTIVE:  /81  Pulse 67  Ht 6' (1.829 m)  Wt 190 lb (86.2 kg)  SpO2 97%  BMI 25.77 kg/m2  GENERAL APPEARANCE: Alert, in no acute distress  EYES: normal  EARS: External ears normal. Canals clear. TMs with mildly increased clear effusions, no erythema  NOSE:mild clear discharge and mildly inflamed mucosa  OROPHARYNX:minimal posterior erythema, no tonsillar hypertrophy and no exudates present  NECK:No adenopathy,masses or thyromegaly  RESP: normal and clear to auscultation  CV:regular rate and rhythm and no murmurs, clicks, or gallops  ABDOMEN: Abdomen soft, non-tender. BS normal. No masses, organomegaly  SKIN: no ulcers, lesions or rash  MUSCULOSKELETAL:Musculoskeletal normal      RESULTS  Results for orders placed or performed in visit on 10/24/18   Mononucleosis screen   Result Value Ref Range    Mononucleosis Screen Negative NEG^Negative   Strep, Rapid Screen   Result Value Ref Range    Specimen Description Throat     Rapid Strep A Screen       NEGATIVE: No Group A streptococcal antigen detected by immunoassay, await culture report.   .  No results found for this or any previous visit (from the past 48 hour(s)).    ASSESSMENT/PLAN:    ASSESSMENT / PLAN:  (J02.9) Sore throat  (primary encounter diagnosis)  Comment: ongoing sxs.  Negative rapid strep and rapid mono.  Suspect may be due to ongoing post-nasal drainage either allergy or viral.  Currently no sign fo tonsillar abscess.  He reports some snoring over past months, so will have him f/u with ent if his throat pain not improve as could have some underlying issue.  Plan: Strep, Rapid Screen, Beta strep group A        culture, Mononucleosis screen, fluticasone         (FLONASE) 50 MCG/ACT spray, OTOLARYNGOLOGY         REFERRAL        Reviewed medication instructions and side effects. Follow up if experiences side effects.. I reviewed supportive care, expected course, and signs of concern.  Follow up with ENT as needed or if he does  not improve within 7 day(s) or if worsens in any way.  Reviewed red flag symptoms and is to go to the ER if experiences any of these.      See [facility] for orders, medications, letters, patient instructions    Kim Garcia M.D.

## 2018-10-24 NOTE — MR AVS SNAPSHOT
After Visit Summary   10/24/2018    Gil Limon    MRN: [id number]           Patient Information     Date Of Birth          1975        Visit Information        Provider Department      10/24/2018 3:20 PM Kim Garcia MD [facility]        Today's Diagnoses     Sore throat    -  1       Follow-ups after your visit        Additional Services     OTOLARYNGOLOGY REFERRAL       Your provider has referred you to: [facility]: [facility] [phone]  [email or web address]    Please be aware that coverage of these services is subject to the terms and limitations of your health insurance plan.  Call member services at your health plan with any benefit or coverage questions.      Please bring the following with you to your appointment:    (1) Any X-Rays, CTs or MRIs which have been performed.  Contact the facility where they were done to arrange for  prior to your scheduled appointment.   (2) List of current medications  (3) This referral request   (4) Any documents/labs given to you for this referral                  Follow-up notes from your care team     Return in about 1 week (around 10/31/2018), or if symptoms worsen or fail to improve, for with ENT doctor.      Who to contact     If you have questions or need follow up information about today's clinic visit or your schedule please contact [facility] directly at [phone].  Normal or non-critical lab and imaging results will be communicated to you by MyChart, letter or phone within 4 business days after the clinic has received the results. If you do not hear from us within 7 days, please contact the clinic through MyChart or phone. If you have a critical or abnormal lab result, we will notify you by phone as soon as possible.  Submit refill requests through [employer] or call your pharmacy and they will forward the refill request to us. Please allow 3 business days for  your refill to be completed.          Additional Information About Your Visit        Care EveryWhere ID     This is your Care EveryWhere ID. This could be used by other organizations to access your [place] medical records  [id number]        Your Vitals Were     Pulse Height Pulse Oximetry BMI (Body Mass Index)          67 6' (1.829 m) 97% 25.77 kg/m2         Blood Pressure from Last 3 Encounters:   10/24/18 127/81   10/16/18 133/84   07/03/17 143/86    Weight from Last 3 Encounters:   10/24/18 190 lb (86.2 kg)   10/16/18 190 lb (86.2 kg)   07/03/17 181 lb (82.1 kg)              We Performed the Following     Beta strep group A culture     Mononucleosis screen     OTOLARYNGOLOGY REFERRAL     Strep, Rapid Screen          Today's Medication Changes          These changes are accurate as of 10/24/18  4:32 PM.  If you have any questions, ask your nurse or doctor.               Start taking these medicines.        Dose/Directions    fluticasone 50 MCG/ACT spray   Commonly known as:  FLONASE   Used for:  Sore throat   Started by:  Kim Garcia MD        Dose:  2 spray   Spray 2 sprays into both nostrils daily   Quantity:  1 Bottle   Refills:  0         Stop taking these medicines if you haven't already. Please contact your care team if you have questions.     oxymetazoline 0.05 % spray   Commonly known as:  AFRIN NASAL SPRAY   Stopped by:  Kim Garcia MD                Where to get your medicines      These medications were sent to [facility] [address], [address]  [address], [facility] [address], [facility] [address]     Phone:  [phone]     fluticasone 50 MCG/ACT spray                Primary Care Provider Office Phone # Fax #    Scar [place] [phone] [address] [facility] [address]        Equal Access to Services     DIOGENES GUARDADO AH: Phani Vazquez, wadianada luqadaha, qaalonso esposito, jess christopher  ivanna flores ah. So [facility] [phone].    ATENCIÓN: Si habla mao, tiene a little disposición servicios gratuitos de asistencia lingüística. Tanna al [phone].    We comply with applicable federal civil rights laws and [place] laws. We do not discriminate on the basis of race, color, national origin, age, disability, sex, sexual orientation, or gender identity.            Thank you!     Thank you for choosing [facility]  for your care. Our goal is always to provide you with excellent care. Hearing back from our patients is one way we can continue to improve our services. Please take a few minutes to complete the written survey that you may receive in the mail after your visit with us. Thank you!             Your Updated Medication List - Protect others around you: Learn how to safely use, store and throw away your medicines at www.disposemymeds.org.          This list is accurate as of 10/24/18  4:32 PM.  Always use your most recent med list.                   Brand Name Dispense Instructions for use Diagnosis    fluticasone 50 MCG/ACT spray    FLONASE    1 Bottle    Spray 2 sprays into both nostrils daily    Sore throat

## 2018-10-24 NOTE — LETTER
[facility]  [address] [facility] [place] [address]    October 26, 2018    Gil Limon  [address] [place] [id number]          Dear Gil,    Enclosed is a copy of your results. Your strep culture is negative.    Results for orders placed or performed in visit on 10/24/18   Mononucleosis screen   Result Value Ref Range    Mononucleosis Screen Negative NEG^Negative   Strep, Rapid Screen   Result Value Ref Range    Specimen Description Throat     Rapid Strep A Screen       NEGATIVE: No Group A streptococcal antigen detected by immunoassay, await culture report.   Beta strep group A culture   Result Value Ref Range    Specimen Description Throat     Culture Micro No beta hemolytic Streptococcus Group A isolated        If you have any questions or concerns, please call myself or my nurse at [phone].      Sincerely,        Kim Garcia MD/alfredo

## 2018-10-25 LAB
BACTERIA SPEC CULT: NORMAL
SPECIMEN SOURCE: NORMAL

## 2018-11-15 NOTE — PROGRESS NOTES
SUBJECTIVE:   Gil Limon is a 43 year old male who presents to clinic today for the following health issues:    Genitourinary symptoms    Duration: x 2 months    Description:  Discharge clear discharge, Pain the genital area    Intensity:  moderate    Accompanying signs and symptoms (fever/discharge/nausea/vomiting/back or abdominal pain):  None    History (frequent UTI's/kidney stones/prostate problems): None  Sexually active: YES    Precipitating or alleviating factors: None    Therapies tried and outcome: none   Outcome: none     Urethral irritation, with a clear constant discharge.  Sexually active: with 2 partners    Problem list and histories reviewed & adjusted, as indicated.  Additional history: as documented    Patient Active Problem List   Diagnosis     CARDIOVASCULAR SCREENING; LDL GOAL LESS THAN 160     Past Surgical History:   Procedure Laterality Date     LAP VENTRAL HERNIA REPAIR      2003        Social History   Substance Use Topics     Smoking status: Never Smoker     Smokeless tobacco: Never Used     Alcohol use Yes      Comment: SOC     Family History   Problem Relation Age of Onset     Osteoporosis Mother      Osteoporosis Maternal Grandmother      Cerebrovascular Disease Maternal Grandfather      Lipids Maternal Grandfather      Cancer Father      skin, melanoma           Reviewed and updated as needed this visit by clinical staff  Tobacco  Allergies  Meds  Med Hx  Surg Hx  Fam Hx  Soc Hx      Reviewed and updated as needed this visit by Provider       ROS:  Constitutional, HEENT, cardiovascular, pulmonary, gi and gu systems are negative, except as otherwise noted.    OBJECTIVE:     Pulse 75  Temp 97  F (36.1  C) (Oral)  Resp 13  Ht 6' (1.829 m)  Wt 192 lb 3.2 oz (87.2 kg)  SpO2 100%  BMI 26.07 kg/m2  Body mass index is 26.07 kg/(m^2).  GENERAL: healthy, alert and no distress  NECK: no adenopathy and thyroid normal to palpation  RESP: lungs clear to auscultation - no rales,  rhonchi or wheezes  CV: regular rate and rhythm, normal S1 S2, no S3 or S4, no murmur, click or rub  ABDOMEN: soft, nontender, no masses and bowel sounds normal  MS: no gross musculoskeletal defects noted, no edema    Diagnostic Test Results:  none     ASSESSMENT/PLAN:     (R30.0) Dysuria  Comment: I discussed the transmission and risks associated with STD's as well as appropriate prevention and screening. Will check urinalysis and chlamydia  Plan: *UA reflex to Microscopic and Culture ([place]         and [facility] (except [place] and         [place]), Neisseria gonorrhoeae PCR, Chlamydia         trachomatis PCR    (Z23) Need for prophylactic vaccination and inoculation against influenza  Comment: Declines    Return in about 1 week (around 11/23/2018).     Jaspal Okeefe MD  [facility]

## 2018-11-16 ENCOUNTER — OFFICE VISIT (OUTPATIENT)
Dept: FAMILY MEDICINE | Facility: CLINIC | Age: 43
End: 2018-11-16
Payer: COMMERCIAL

## 2018-11-16 VITALS
RESPIRATION RATE: 13 BRPM | HEART RATE: 75 BPM | TEMPERATURE: 97 F | WEIGHT: 192.2 LBS | HEIGHT: 72 IN | BODY MASS INDEX: 26.03 KG/M2 | OXYGEN SATURATION: 100 %

## 2018-11-16 DIAGNOSIS — R30.0 DYSURIA: Primary | ICD-10-CM

## 2018-11-16 DIAGNOSIS — Z23 NEED FOR PROPHYLACTIC VACCINATION AND INOCULATION AGAINST INFLUENZA: ICD-10-CM

## 2018-11-16 LAB
ALBUMIN UR-MCNC: NEGATIVE MG/DL
APPEARANCE UR: CLEAR
BILIRUB UR QL STRIP: NEGATIVE
COLOR UR AUTO: YELLOW
GLUCOSE UR STRIP-MCNC: NEGATIVE MG/DL
HGB UR QL STRIP: NEGATIVE
KETONES UR STRIP-MCNC: NEGATIVE MG/DL
LEUKOCYTE ESTERASE UR QL STRIP: NEGATIVE
NITRATE UR QL: NEGATIVE
PH UR STRIP: 6 PH (ref 5–7)
SOURCE: NORMAL
SP GR UR STRIP: 1.02 (ref 1–1.03)
UROBILINOGEN UR STRIP-ACNC: 0.2 EU/DL (ref 0.2–1)

## 2018-11-16 PROCEDURE — 81003 URINALYSIS AUTO W/O SCOPE: CPT | Performed by: FAMILY MEDICINE

## 2018-11-16 PROCEDURE — 87591 N.GONORRHOEAE DNA AMP PROB: CPT | Performed by: FAMILY MEDICINE

## 2018-11-16 PROCEDURE — 99213 OFFICE O/P EST LOW 20 MIN: CPT | Performed by: FAMILY MEDICINE

## 2018-11-16 PROCEDURE — 87491 CHLMYD TRACH DNA AMP PROBE: CPT | Performed by: FAMILY MEDICINE

## 2018-11-16 NOTE — MR AVS SNAPSHOT
After Visit Summary   11/16/2018    Gil Limon    MRN: [id number]           Patient Information     Date Of Birth          1975        Visit Information        Provider Department      11/16/2018 12:00 PM Jaspal Okeefe MD [facility]        Today's Diagnoses     Need for prophylactic vaccination and inoculation against influenza    -  1    Dysuria          Care Instructions    [facility]    If you have any questions regarding to your visit please contact your care team:       Team Purple:   Clinic Hours Telephone Number   Dr. Lynne Castro   7am-7pm  Monday - Thursday   7am-5pm  Fridays  [phone]  (Appointment scheduling available 24/7)   Urgent Care - [place] and [facility] - 11am-9pm Monday-Friday Saturday-Sunday- 9am-5pm   [place] - 5pm-9pm Monday-Friday Saturday-Sunday- 9am-5pm  [phone] - [place]  [phone] - [place]       What options do I have for a visit other than an office visit? We offer electronic visits (e-visits) and telephone visits, when medically appropriate.  Please check with your medical insurance to see if these types of visits are covered, as you will be responsible for any charges that are not paid by your insurance.      You can use [employer] (secure electronic communication) to access to your chart, send your primary care provider a message, or make an appointment. Ask a team member how to get started.     For a price quote for your services, please call our Consumer Price Line at [phone] or our Imaging Cost estimation line at [phone] (for imaging tests).              Follow-ups after your visit        Follow-up notes from your care team     Return in about 1 week (around 11/23/2018).      Who to contact     If you have questions or need follow up information about today's clinic visit or your schedule please contact [facility]  directly at [phone].  Normal or non-critical lab and imaging results will be communicated to you by MyChart, letter or phone within 4 business days after the clinic has received the results. If you do not hear from us within 7 days, please contact the clinic through MyChart or phone. If you have a critical or abnormal lab result, we will notify you by phone as soon as possible.  Submit refill requests through [employer] or call your pharmacy and they will forward the refill request to us. Please allow 3 business days for your refill to be completed.          Additional Information About Your Visit        Care EveryWhere ID     This is your Care EveryWhere ID. This could be used by other organizations to access your [place] medical records  [id number]        Your Vitals Were     Pulse Temperature Respirations Height Pulse Oximetry BMI (Body Mass Index)    75 97  F (36.1  C) (Oral) 13 6' (1.829 m) 100% 26.07 kg/m2       Blood Pressure from Last 3 Encounters:   10/24/18 127/81   10/16/18 133/84   07/03/17 143/86    Weight from Last 3 Encounters:   11/16/18 192 lb 3.2 oz (87.2 kg)   10/24/18 190 lb (86.2 kg)   10/16/18 190 lb (86.2 kg)              We Performed the Following     *UA reflex to Microscopic and Culture ([place] and [facility] (except [place] and [place])     Chlamydia trachomatis PCR     Neisseria gonorrhoeae PCR        Primary Care Provider Office Phone # Fax #    Scar [place] [phone] [address] [facility] [address]        Equal Access to Services     Kindred HospitalOWEN : Hadii aad ku hadasho Soomaali, waaxda luqadaha, qaybta kaalmada vaibhav, jess idiin hayaan adeeg kharash la'aan . So [facility] [phone].    ATENCIÓN: Si habla español, tiene a little disposición servicios gratuitos de asistencia lingüística. Llame al [phone].    We comply with applicable federal civil rights laws and [place] laws. We do not discriminate on the basis of race, color,  national origin, age, disability, sex, sexual orientation, or gender identity.            Thank you!     Thank you for choosing [facility]  for your care. Our goal is always to provide you with excellent care. Hearing back from our patients is one way we can continue to improve our services. Please take a few minutes to complete the written survey that you may receive in the mail after your visit with us. Thank you!             Your Updated Medication List - Protect others around you: Learn how to safely use, store and throw away your medicines at www.disposemymeds.org.          This list is accurate as of 11/16/18 12:40 PM.  Always use your most recent med list.                   Brand Name Dispense Instructions for use Diagnosis    fluticasone 50 MCG/ACT spray    FLONASE    1 Bottle    Spray 2 sprays into both nostrils daily    Sore throat

## 2018-11-16 NOTE — PATIENT INSTRUCTIONS
[facility]    If you have any questions regarding to your visit please contact your care team:       Team Purple:   Clinic Hours Telephone Number   Dr. Lynne Castro   7am-7pm  Monday - Thursday   7am-5pm  Fridays  [phone]  (Appointment scheduling available 24/7)   Urgent Care - [place] and [facility] - 11am-9pm Monday-Friday Saturday-Sunday- 9am-5pm   [place] - 5pm-9pm Monday-Friday Saturday-Sunday- 9am-5pm  [phone] - [place]  [phone] - [place]       What options do I have for a visit other than an office visit? We offer electronic visits (e-visits) and telephone visits, when medically appropriate.  Please check with your medical insurance to see if these types of visits are covered, as you will be responsible for any charges that are not paid by your insurance.      You can use [employer] (secure electronic communication) to access to your chart, send your primary care provider a message, or make an appointment. Ask a team member how to get started.     For a price quote for your services, please call our Consumer Price Line at [phone] or our Imaging Cost estimation line at [phone] (for imaging tests).

## 2018-11-16 NOTE — NURSING NOTE
Chief Complaint   Patient presents with     Kidney Stone Related     STD     Health Maintenance     HIV, Lipid screen, PHQ2, flu shot      Initial Pulse 75  Temp 97  F (36.1  C) (Oral)  Resp 13  Ht 6' (1.829 m)  Wt 192 lb 3.2 oz (87.2 kg)  SpO2 100%  BMI 26.07 kg/m2 Estimated body mass index is 26.07 kg/(m^2) as calculated from the following:    Height as of this encounter: 6' (1.829 m).    Weight as of this encounter: 192 lb 3.2 oz (87.2 kg).  BP completed using cuff size: kristine Blakely

## 2018-11-18 LAB
C TRACH DNA SPEC QL NAA+PROBE: NEGATIVE
N GONORRHOEA DNA SPEC QL NAA+PROBE: NEGATIVE
SPECIMEN SOURCE: NORMAL
SPECIMEN SOURCE: NORMAL

## 2019-08-13 ENCOUNTER — OFFICE VISIT (OUTPATIENT)
Dept: ORTHOPEDICS | Facility: CLINIC | Age: 44
End: 2019-08-13

## 2019-08-13 VITALS — DIASTOLIC BLOOD PRESSURE: 62 MMHG | HEIGHT: 72 IN | SYSTOLIC BLOOD PRESSURE: 110 MMHG | BODY MASS INDEX: 26.07 KG/M2

## 2019-08-13 DIAGNOSIS — S86.111A STRAIN OF RIGHT GASTROCNEMIUS MUSCLE, INITIAL ENCOUNTER: Primary | ICD-10-CM

## 2019-08-13 PROCEDURE — 99214 OFFICE O/P EST MOD 30 MIN: CPT | Performed by: FAMILY MEDICINE
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
## 2019-08-13 NOTE — PATIENT INSTRUCTIONS
Patient Education   Diagnosis: Gastroc tear  Image Findings: none  Treatment: Relative rest, ice as needed, dorsal night splint  Medications: Limited tylenol/ibuprofen for pain for 1-2 weeks  Follow-up: As needed    It was great seeing you today!    Junior Kim  Understanding Gastrocnemius Muscle Tear    A gastrocnemius muscle tear is a severe, sudden injury to your calf muscle. This muscle helps flex the lower leg. It also helps you do quick movements, such as jumping and sprinting. An injury to this muscle is sometimes called  tennis leg.      How to say it  [id number]-[id number]   What causes a gastrocnemius muscle tear?  This injury can happen if you make a sudden quick movement that overstretches the muscle. Such movements include jumping or quickly changing direction. People who play sports like tennis or basketball are more likely to suffer such a muscle tear. Exercising too much or not warming up properly can weaken the gastrocnemius muscle.  Symptoms of a gastrocnemius muscle tear  At the time of injury, you may hear a popping sound or feel a tearing sensation in your calf. Other symptoms include:    Pain    Swelling    Bruising    Problems walking or putting weight on the injured leg  Treatment for a gastrocnemius muscle tear  Treatment depends on how severe the muscle tear is. The severity will also affect how long it takes to heal. Treatments may include:    Rest. Avoid walking or other activities that cause pain.    Ice. Putting ice on the injured area may ease swelling.    Elevation. Propping up your calf slightly above the level of your heart may lessen swelling.    Prescription or over-the-counter medicines. These help reduce pain and swelling.    Walking boot or crutches. Your healthcare provider may fit you with one of these devices to help you move with less pain.    Heel lifts. These are placed inside your shoes. When wearing them, your calf won t stretch as much, easing pain.    Compression  sleeve. You may need to wear one of these to prevent blood clots in your injured leg.    Strengthening and stretching exercises. Certain exercises can help you regain strength and flexibility in your calf.  Complications of gastrocnemius muscle tear    Calf weakness    Acute compartment syndrome, when pressure builds up in the muscles of the foot and impedes blood flow    Deep vein thrombosis, when blood clots form in the veins of the legs     When to call your healthcare provider  Call your healthcare provider right away if you have any of these:    Fever of 100.4 F (38 C) or higher, or as directed    Pain that gets worse despite pain medicine and rest    Symptoms that don t get better, or get worse    New symptoms   Date Last Reviewed: 3/10/2016    8683-6453 [employer]. [address] [place] [address], [place] [address]. All rights reserved. This information is not intended as a substitute for professional medical care. Always follow your healthcare professional's instructions.

## 2020-02-23 ENCOUNTER — HEALTH MAINTENANCE LETTER (OUTPATIENT)
Age: 45
End: 2020-02-23

## 2023-10-26 ENCOUNTER — ANCILLARY PROCEDURE (OUTPATIENT)
Dept: GENERAL RADIOLOGY | Facility: CLINIC | Age: 48
End: 2023-10-26
Attending: FAMILY MEDICINE

## 2023-10-26 ENCOUNTER — OFFICE VISIT (OUTPATIENT)
Dept: ORTHOPEDICS | Facility: CLINIC | Age: 48
End: 2023-10-26

## 2023-10-26 VITALS — BODY MASS INDEX: 25.82 KG/M2 | WEIGHT: 190.4 LBS

## 2023-10-26 DIAGNOSIS — M25.511 ACUTE PAIN OF RIGHT SHOULDER: ICD-10-CM

## 2023-10-26 DIAGNOSIS — M67.911 TENDINOPATHY OF ROTATOR CUFF, RIGHT: Primary | ICD-10-CM

## 2023-10-26 DIAGNOSIS — M25.511 RIGHT SHOULDER PAIN: ICD-10-CM

## 2023-10-26 PROCEDURE — 73030 X-RAY EXAM OF SHOULDER: CPT | Mod: TC | Performed by: RADIOLOGY

## 2023-10-26 PROCEDURE — 99204 OFFICE O/P NEW MOD 45 MIN: CPT | Performed by: FAMILY MEDICINE

## 2023-10-26 NOTE — PATIENT INSTRUCTIONS
# Right shoulder rotator cuff tendinopathy: Gil Limon  was seen today for right shoulder pain. 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 On examination there are positive findings of tenderness to palpation over the rotator cuff tendon pain but no weakness with rotator cuff testing. Imaging findings showed no arthritis, no fracture. Likely cause of patient's condition due to irritated rotator cuff tendon. Counseled patient on nature of condition and treatment options.  Given this plan as below, follow-up one month as needed     Image Findings: No shoulder arthritis  Treatment: Activities as tolerated, home exercises given today  Job: As tolerated   Medications/Injections: Limited tylenol/ibuprofen for pain for 1-2 weeks, Topical Voltaren gel, defer for now  Follow-up: In one month if symptoms do not improve, sooner if worsening  Can consider steroid injection, PT    Please call [phone]   Ask for my team if you have any questions or concerns    If you have not yet received the influenza vaccine but would like to get one, please call  [phone] or you can schedule via [employer]    It was great seeing you again today!    Junior Kim MD, [facility]

## 2023-10-26 NOTE — LETTER
10/26/2023         RE: Gil Limon  [address] [place] [id number]        Dear Colleague,    Thank you for referring your patient, Gil Limon, to the [facility]. Please see a copy of my visit note below.    ASSESSMENT & PLAN    Gil was seen today for pain.    Diagnoses and all orders for this visit:    Tendinopathy of rotator cuff, right  -     Physical Therapy Referral; Future    Acute pain of right shoulder  -     XR Shoulder Right G/E 3 Views; Future  -     Physical Therapy Referral; Future      This issue is acute on chronic and Worsening.    # Right shoulder rotator cuff tendinopathy: Gil Limon  was seen today for right shoulder pain. 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 On examination there are positive findings of tenderness to palpation over the rotator cuff tendon pain but no weakness with rotator cuff testing. Imaging findings showed no arthritis, no fracture. Likely cause of patient's condition due to irritated rotator cuff tendon. Counseled patient on nature of condition and treatment options.  Given this plan as below, follow-up one month as needed     Image Findings: No shoulder arthritis  Treatment: Activities as tolerated, home exercises given today  Job: As tolerated   Medications/Injections: Limited tylenol/ibuprofen for pain for 1-2 weeks, Topical Voltaren gel, defer for now  Follow-up: In one month if symptoms do not improve, sooner if worsening  Can consider steroid injection, PT      Junior Kim MD  [facility]    -----  Chief Complaint   Patient presents with     Right Shoulder - Pain       SUBJECTIVE  Gil Limon is a/an 48 year old male who is seen as a self referral for evaluation of right shoulder pain.     The patient is seen by themselves.  The patient is Left handed    Onset: 1-2  month(s) ago has had shoulder pain on/off for years. Reports insidious onset without acute precipitating event.  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     REVIEW OF SYSTEMS:  Review of Systems  Constitutional, HEENT, cardiovascular, pulmonary, gi and gu systems are negative, except as otherwise noted.    OBJECTIVE:  Wt 86.4 kg (190 lb 6.4 oz)   BMI 25.82 kg/m     General: healthy, alert and in no distress  HEENT: no scleral icterus or conjunctival erythema  Skin: no suspicious lesions or rash. No jaundice.  CV: distal perfusion intact     Resp: normal respiratory effort without conversational dyspnea   Psych: normal mood and affect  Gait: normal steady gait with appropriate coordination and balance   Neuro: Normal light sensory exam of right upper extremity     Ortho Exam   RIGHT SHOULDER  Inspection:    no swelling, bruising, discoloration, or obvious deformity or asymmetry  Palpation:    Tender about the supraspinatus insertion. Remainder of bony and tendinous landmarks are nontender.  Active Range of Motion:     Abduction 1650, FF 1650, , IR L1.    Strength:    Scapular plane abduction 5/5,  ER 5/5, IR 5/5, biceps 5/5, triceps 5/5  Special Tests:    Positive: supraspinatus (empty can) pain no weakness    Negative: Neer's, Paris', Speed's, and Yergason's    CERVICAL SPINE  Inspection:    normal cervical lordosis present, rounded shoulders, forward head posture  Palpation:    Nontender.  Range of Motion:     Flexion full    Extension full    Right side bend full    Left side bend full    Right rotation full     Left rotation full  Strength:    Full strength throughout all neck muscles  Special Tests:    Positive: None    Negative: Spurling's (bilateral)    RADIOLOGY:  I independently ordered, visualized and reviewed these images with the patient  No arthritis, no fracture      Review of external notes as documented elsewhere in note  Review of the result(s) of each unique test - right shoulder x-rays       Disclaimer: This note consists of symbols derived from keyboarding, dictation and/or voice recognition software. As a result, there may be errors in the script that have gone undetected. Please consider this when interpreting information found in this chart.      Again, thank you for allowing me to participate in the care of your patient.        Sincerely,        Junior Kim MD

## 2023-10-26 NOTE — PROGRESS NOTES
ASSESSMENT & PLAN    Gil was seen today for pain.    Diagnoses and all orders for this visit:    Tendinopathy of rotator cuff, right  -     Physical Therapy Referral; Future    Acute pain of right shoulder  -     XR Shoulder Right G/E 3 Views; Future  -     Physical Therapy Referral; Future      This issue is acute on chronic and Worsening.    # Right shoulder rotator cuff tendinopathy: Gil Limon  was seen today for right shoulder pain. 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 On examination there are positive findings of tenderness to palpation over the rotator cuff tendon pain but no weakness with rotator cuff testing. Imaging findings showed no arthritis, no fracture. Likely cause of patient's condition due to irritated rotator cuff tendon. Counseled patient on nature of condition and treatment options.  Given this plan as below, follow-up one month as needed     Image Findings: No shoulder arthritis  Treatment: Activities as tolerated, home exercises given today  Job: As tolerated   Medications/Injections: Limited tylenol/ibuprofen for pain for 1-2 weeks, Topical Voltaren gel, defer for now  Follow-up: In one month if symptoms do not improve, sooner if worsening  Can consider steroid injection, PT      Junior Kim MD  [facility]    -----  Chief Complaint   Patient presents with    Right Shoulder - Pain       SUBJECTIVE  Gil Limon is a/an 48 year old male who is seen as a self referral for evaluation of right shoulder pain.     The patient is seen by themselves.  The patient is Left handed    Onset: 1-2 month(s) ago has had shoulder pain on/off for years. Reports insidious onset without acute precipitating event.  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     REVIEW OF SYSTEMS:  Review of Systems  Constitutional, HEENT, cardiovascular, pulmonary, gi and gu systems are negative, except as otherwise noted.    OBJECTIVE:  Wt 86.4 kg (190 lb 6.4 oz)   BMI 25.82 kg/m     General: healthy, alert and in no distress  HEENT: no scleral icterus or conjunctival erythema  Skin: no suspicious lesions or rash. No jaundice.  CV: distal perfusion intact     Resp: normal respiratory effort without conversational dyspnea   Psych: normal mood and affect  Gait: normal steady gait with appropriate coordination and balance   Neuro: Normal light sensory exam of right upper extremity     Ortho Exam   RIGHT SHOULDER  Inspection:    no swelling, bruising, discoloration, or obvious deformity or asymmetry  Palpation:    Tender about the supraspinatus insertion. Remainder of bony and tendinous landmarks are nontender.  Active Range of Motion:     Abduction 1650, FF 1650, , IR L1.    Strength:    Scapular plane abduction 5/5,  ER 5/5, IR 5/5, biceps 5/5, triceps 5/5  Special Tests:    Positive: supraspinatus (empty can) pain no weakness    Negative: Neer's, Paris', Speed's, and Yergason's    CERVICAL SPINE  Inspection:    normal cervical lordosis present, rounded shoulders, forward head posture  Palpation:    Nontender.  Range of Motion:     Flexion full    Extension full    Right side bend full    Left side bend full    Right rotation full    Left rotation full  Strength:    Full strength throughout all neck muscles  Special Tests:    Positive: None    Negative: Spurling's (bilateral)    RADIOLOGY:  I independently ordered, visualized and reviewed these images with the patient  No arthritis, no  fracture      Review of external notes as documented elsewhere in note  Review of the result(s) of each unique test - right shoulder x-rays       Disclaimer: This note consists of symbols derived from keyboarding, dictation and/or voice recognition software. As a result, there may be errors in the script that have gone undetected. Please consider this when interpreting information found in this chart.

## 2024-02-11 ENCOUNTER — HEALTH MAINTENANCE LETTER (OUTPATIENT)
Age: 49
End: 2024-02-11

## 2025-03-08 ENCOUNTER — HEALTH MAINTENANCE LETTER (OUTPATIENT)
Age: 50
End: 2025-03-08